# Patient Record
(demographics unavailable — no encounter records)

---

## 2025-01-21 NOTE — PHYSICAL EXAM
[FreeTextEntry1] :  The patient was alert and oriented and in no distress. Voice was clear.  Face: The patient had no facial asymmetry or mass. The skin was unremarkable.  Eyes: The pupils were equal round and reactive to light and accommodation. There was no significant nystagmus or disconjugate gaze noted.  Nose:  The external nose had no significant deformity.  There was no facial tenderness.  On anterior rhinoscopy, the nasal mucosa was clear.  The anterior septum was midline.  There were no visualized polyps purulence  or masses.  He had pinching of the nasal valves but his [place]sm[place] maneuver was plus minus  Oral cavity: The oral mucosa was normal. The oral and base of tongue were clear and without mass. The gingival and buccal mucosa were moist and without lesions. The palate moved well. There was no cleft to the palate. There appeared to be good salivary flow.   There was no pus, erythema or mass in the oral cavity.   Ears: The external ears were normal without deformity. The ear canals were clear. The tympanic membranes were intact and normal.  Neck:  The neck was symmetrical. The parotid and submandibular glands were normal without masses. The trachea was midline and there was no unusual crepitus. The thyroid was smooth and nontender and no masses were palpated. There was no significant cervical adenopathy.   Neuro: Neurologically, the patient was awake, alert, and oriented to person, place and time. There were no obvious focal neurologic abnormalities.  Cranial nerves II through XII were grossly intact.   TMJ: The temporomandibular joints were nontender. There was no abnormal crepitus and no significant malocclusion  Nasal endoscopy:  CPT 02364 Procedure Note:  Endoscopy was done with Covid precautions and with video. All risks and benefits were discussed with the patient and consent obtained.  Nasal endoscopy was done with topical anesthesia of Pontocaine and Afrin and a      nasal endoscope. Indication: Nasal congestion, rule out sinusitis. Procedure: The nasal cavity was anesthetized with topical Afrin and Pontocaine. An  endoscope was used and inserted into the nasal cavity. Attention was first paid to the anterior nasal cavity. Endocoscopy was performed to inspect the interior of the nasal cavity, the nasal septum,  the middle and superior meati, the inferior, middle and superior turbinates, and the spheno-ethmoidal  recesses, the nasopharynx and eustachian tube orifices bilaterally. including the nasal mocosa, the possibility of polyps and the consistency of the nasal mucous. All findings were normal except:  The nasal mucosa is somewhat boggy with an S-shaped deflection and narrowed nasal valves and inferior turban hypertrophy

## 2025-01-21 NOTE — HISTORY OF PRESENT ILLNESS
[de-identified] : LAURIE OSWALD is a 77 year old male who comes in complaining of Nighttime nasal congestion.  He feels this wakes him up quite a bit at night.  He does not have significant obstruction during the day.  He has not had more epilepsy recently.  He got hearing aids since I saw him last which she finds are helping quite a bit.  The patient had no other ear nose or throat complaints at this visit.

## 2025-01-21 NOTE — ASSESSMENT
[FreeTextEntry1] : Continue same meds Levels and Na check Continue same dose until we get the results.

## 2025-01-21 NOTE — PHYSICAL EXAM
[Short Term Intact] : short term memory intact [Concentration Intact] : normal concentrating ability [Naming Objects] : no difficulty naming common objects [Fluency] : fluency intact [Comprehension] : comprehension intact [Cranial Nerves Optic (II)] : visual acuity intact bilaterally,  visual fields full to confrontation, pupils equal round and reactive to light [Cranial Nerves Facial (VII)] : face symmetrical [Cranial Nerves Trigeminal (V)] : facial sensation intact symmetrically [Motor Tone] : muscle tone was normal in all four extremities [Motor Strength] : muscle strength was normal in all four extremities [Motor Handedness Right-Handed] : the patient is right hand dominant [Abnormal Walk] : normal gait [2+] : Brachioradialis left 2+

## 2025-01-21 NOTE — DISCUSSION/SUMMARY
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  Doing well  Seizure free except x some auras

## 2025-01-21 NOTE — CONSULT LETTER
[FreeTextEntry2] : CANDY TAYLOR [FreeTextEntry1] :   Dear  Dr. CANDY TAYLOR,  I had the pleasure of seeing your patient today.   Please see my note below.   Thank you very much for allowing me to participate in the care of your patient.  Sincerely,  Crispin Johnson MD [facility]  [facility]

## 2025-01-21 NOTE — REVIEW OF SYSTEMS
[Depression] : depression [As Noted in HPI] : as noted in HPI [Seizures] : convulsions [Negative] : Cardiovascular

## 2025-01-21 NOTE — ASSESSMENT
[FreeTextEntry1] :  It was my impression that he was doing quite well with his hearing aids.  I recommended repeat evaluation in 9 months and repeating his hearing test at that time.  He has the recumbent nasal congestion. I recommended hypertonic saline irrigations followed by fluticasone at night.  If he is not doing well enough I would suggest nasal valve remodeling and inferior turbinate reductions  His previous reflux is better.

## 2025-01-21 NOTE — HISTORY OF PRESENT ILLNESS
[FreeTextEntry1] : Follow up  HPI    2025 Having more small simple focal sz. At least 2 a day.  Seeing Dr Nettles for depression  On Buspirone 10 mg BID Prozac 40 mg Trileptal 300 mg amd 450 mg qhs  MRI recently no changes.  Lung Ca. Stable   HPI     10/1/2024 Auras same freq lung biopsy adenocarcinoma. No Tx Anxiety and panic attacks  Meds Oxc 450 mg QD  24 HPI: Kenneth is a 76 year old male presenting for a follow up visit for seizures and frontal lobe lesion.  Increased Trileptal to 600mg daily due to stress from recent pulmonary surgery in February. Taking 300mg BID. Surgery went well, no further evidence of cancer. Experiencing lingering fatigue and numbness but overall recovering well. ---------------------------- Last seen 23: Recently tried to decrease Oxcarb from 450mg to 300mg due to worsening sedation. No auras from end of May through early October. Auras started to increase at that time, so he restarted the 450mg.  Notes worsening stress when auras increased. Underwent surgery to have pulmonary nodules removed. Needs to undergo another surgery early next year.  After increasing, experienced first aura last night for 10 seconds. ------------------------ Last seen 23: Complains of bursts of emotions. The example he gives for the sleepiness is a period of sleepiness in the afternoon (1p - 4p). He takes the morning dose at 830 am and 10 pm at night. The burst of emotion is usually random, an example he gave the other day was turning on the stove he had a burst of fear. Anxiety is worse  Mood is okay but he feels isolated. His daughter has been coming to visit him which makes him happier. The anxiety is much better. He gets more depressed in the evening so he keeps busy and then he feels better.  Current meds: Oxcarbazepine 300 mg QHS  The patient is a . He has 2 children. His wife  of pancreatic cancer many years ago. 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 Anxiety is about the same. Feels better in general Mood in general good except for some mild depression Pulmonary nodules being f/up by pulmonary. To see an MD at [facility]. Decided not to do a biopsy or surgery for now.

## 2025-02-26 NOTE — PHYSICAL EXAM
[Person] : oriented to person [Place] : oriented to place [Time] : oriented to time [Fluency] : fluency intact

## 2025-02-26 NOTE — ASSESSMENT
[FreeTextEntry1] : Discussed in detail need to change AED Will try Xcopri and off Trileptal Will send Rx for started package XCOPRI Will decrease Trileptal by 150 mg on week 2 and by 150 mg on week 4 to 150 mg am and 300 mg pm. Once he gets to 100 mg Xcopri, drop Trileptal to 150 mg BID and see me in about 8 weeks from today

## 2025-02-26 NOTE — HISTORY OF PRESENT ILLNESS
[Home] : at home, [unfilled] , at the time of the visit. [Medical Office: ([facility])___] : at the medical office located in  [Telehealth (audio & video)] : This visit was provided via telehealth using real-time 2-way audio visual technology. [Verbal consent obtained from patient] : the patient, [unfilled] [FreeTextEntry1] : Follow up  HPI    2025  Worse w increasing doses of Trileptal Trileptal level 16 on 450/300 mg a day  More sedated Also more down mood   He failed Vimpat due to anxiety and LTG due to allergic reaction  Discussed in detail rationale for changing aed  HPI    2025 Having more small simple focal sz. At least 2 a day.  Seeing Dr Nettles for depression  On Buspirone 10 mg BID Prozac 40 mg Trileptal 300 mg amd 450 mg qhs  MRI recently no changes.  Lung Ca. Stable   HPI     10/1/2024 Auras same freq lung biopsy adenocarcinoma. No Tx Anxiety and panic attacks  Meds Oxc 450 mg QD  24 HPI: Kenneth is a 76 year old male presenting for a follow up visit for seizures and frontal lobe lesion.  Increased Trileptal to 600mg daily due to stress from recent pulmonary surgery in February. Taking 300mg BID. Surgery went well, no further evidence of cancer. Experiencing lingering fatigue and numbness but overall recovering well. ---------------------------- Last seen 23: Recently tried to decrease Oxcarb from 450mg to 300mg due to worsening sedation. No auras from end of May through early October. Auras started to increase at that time, so he restarted the 450mg.  Notes worsening stress when auras increased. Underwent surgery to have pulmonary nodules removed. Needs to undergo another surgery early next year.  After increasing, experienced first aura last night for 10 seconds. ------------------------ Last seen 23: Complains of bursts of emotions. The example he gives for the sleepiness is a period of sleepiness in the afternoon (1p - 4p). He takes the morning dose at 830 am and 10 pm at night. The burst of emotion is usually random, an example he gave the other day was turning on the stove he had a burst of fear. Anxiety is worse  Mood is okay but he feels isolated. His daughter has been coming to visit him which makes him happier. The anxiety is much better. He gets more depressed in the evening so he keeps busy and then he feels better.  Current meds: Oxcarbazepine 300 mg QHS  The patient is a . He has 2 children. His wife  of pancreatic cancer many years ago. 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 Anxiety is about the same. Feels better in general Mood in general good except for some mild depression Pulmonary nodules being f/up by pulmonary. To see an MD at [facility]. Decided not to do a biopsy or surgery for now.

## 2025-02-26 NOTE — REVIEW OF SYSTEMS
[Feeling Poorly] : feeling poorly [Feeling Tired] : feeling tired [Depression] : depression [As Noted in HPI] : as noted in HPI [Seizures] : convulsions

## 2025-02-26 NOTE — DISCUSSION/SUMMARY
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  Doing POORLY Seizure RECURRENCE AND MOOD CHANGES aed SIDE EFEFCTS

## 2025-07-15 NOTE — DISCUSSION/SUMMARY
[FreeTextEntry1] : 78 y/o male with focal epilepsy, frontal lobe lesion.  Doing well  Seizure free Side effects

## 2025-07-15 NOTE — HISTORY OF PRESENT ILLNESS
[FreeTextEntry1] : Follow up  HPI    7/15/2025 Started Xcopri in Feb  Up to 100 mg now and no seizures x 2 months,   Off Trileptal.   In April he waws admitted for pneumonia in [facility]  Fatigue Sleeps 10 hours a day More cognitive mistakes. Forgets hearing aids, batteries. Not driving now. Forgetfull   HPI    2025  Worse w increasing doses of Trileptal Trileptal level 16 on 450/300 mg a day  More sedated Also more down mood   He failed Vimpat due to anxiety and LTG due to allergic reaction  Discussed in detail rationale for changing aed  HPI    2025 Having more small simple focal sz. At least 2 a day.  Seeing Dr Nettles for depression  On Buspirone 10 mg BID Prozac 40 mg Trileptal 300 mg amd 450 mg qhs  MRI recently no changes.  Lung Ca. Stable   HPI     10/1/2024 Auras same freq lung biopsy adenocarcinoma. No Tx Anxiety and panic attacks  Meds Oxc 450 mg QD  24 HPI: Kenneth is a 76 year old male presenting for a follow up visit for seizures and frontal lobe lesion.  Increased Trileptal to 600mg daily due to stress from recent pulmonary surgery in February. Taking 300mg BID. Surgery went well, no further evidence of cancer. Experiencing lingering fatigue and numbness but overall recovering well. ---------------------------- Last seen 23: Recently tried to decrease Oxcarb from 450mg to 300mg due to worsening sedation. No auras from end of May through early October. Auras started to increase at that time, so he restarted the 450mg.  Notes worsening stress when auras increased. Underwent surgery to have pulmonary nodules removed. Needs to undergo another surgery early next year.  After increasing, experienced first aura last night for 10 seconds. ------------------------ Last seen 23: Complains of bursts of emotions. The example he gives for the sleepiness is a period of sleepiness in the afternoon (1p - 4p). He takes the morning dose at 830 am and 10 pm at night. The burst of emotion is usually random, an example he gave the other day was turning on the stove he had a burst of fear. Anxiety is worse  Mood is okay but he feels isolated. His daughter has been coming to visit him which makes him happier. The anxiety is much better. He gets more depressed in the evening so he keeps busy and then he feels better.  Current meds: Oxcarbazepine 300 mg QHS  The patient is a . He has 2 children. His wife  of pancreatic cancer many years ago. 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 Anxiety is about the same. Feels better in general Mood in general good except for some mild depression Pulmonary nodules being f/up by pulmonary. To see an MD at [facility]. Decided not to do a biopsy or surgery for now.

## 2025-07-15 NOTE — REVIEW OF SYSTEMS
[As Noted in HPI] : as noted in HPI [Decr. Concentrating Ability] : decreased concentrating ability [Difficulty with Language] : ~M difficulty with language [Changed Thought Patterns] : changed thought patterns [Seizures] : convulsions

## 2025-07-15 NOTE — PHYSICAL EXAM
[Person] : oriented to person [Place] : oriented to place [Time] : oriented to time [Fluency] : fluency intact [Cranial Nerves Optic (II)] : visual acuity intact bilaterally,  visual fields full to confrontation, pupils equal round and reactive to light [Cranial Nerves Oculomotor (III)] : extraocular motion intact [Cranial Nerves Facial (VII)] : face symmetrical [Cranial Nerves Vestibulocochlear (VIII)] : hearing was intact bilaterally [Motor Handedness Right-Handed] : the patient is right hand dominant [Sensation Tactile Decrease] : light touch was intact [Abnormal Walk] : normal gait [Limited Balance] : the patient's balance was impaired [2+] : Patella left 2+